# Patient Record
Sex: MALE | Race: WHITE | NOT HISPANIC OR LATINO | Employment: FULL TIME | URBAN - METROPOLITAN AREA
[De-identification: names, ages, dates, MRNs, and addresses within clinical notes are randomized per-mention and may not be internally consistent; named-entity substitution may affect disease eponyms.]

---

## 2023-11-09 ENCOUNTER — HOSPITAL ENCOUNTER (EMERGENCY)
Facility: HOSPITAL | Age: 53
Discharge: HOME | End: 2023-11-09
Attending: STUDENT IN AN ORGANIZED HEALTH CARE EDUCATION/TRAINING PROGRAM
Payer: COMMERCIAL

## 2023-11-09 ENCOUNTER — APPOINTMENT (OUTPATIENT)
Dept: RADIOLOGY | Facility: HOSPITAL | Age: 53
End: 2023-11-09
Payer: COMMERCIAL

## 2023-11-09 VITALS
DIASTOLIC BLOOD PRESSURE: 81 MMHG | TEMPERATURE: 97.9 F | BODY MASS INDEX: 22.4 KG/M2 | HEART RATE: 71 BPM | WEIGHT: 160 LBS | HEIGHT: 71 IN | RESPIRATION RATE: 20 BRPM | SYSTOLIC BLOOD PRESSURE: 117 MMHG | OXYGEN SATURATION: 98 %

## 2023-11-09 DIAGNOSIS — R06.02 SHORTNESS OF BREATH: Primary | ICD-10-CM

## 2023-11-09 DIAGNOSIS — J44.1 COPD EXACERBATION (MULTI): ICD-10-CM

## 2023-11-09 LAB
ANION GAP SERPL CALC-SCNC: 12 MMOL/L
BASOPHILS # BLD AUTO: 0.05 X10*3/UL (ref 0–0.1)
BASOPHILS NFR BLD AUTO: 0.5 %
BUN SERPL-MCNC: 15 MG/DL (ref 8–25)
CALCIUM SERPL-MCNC: 9.1 MG/DL (ref 8.5–10.4)
CHLORIDE SERPL-SCNC: 101 MMOL/L (ref 97–107)
CO2 SERPL-SCNC: 26 MMOL/L (ref 24–31)
CREAT SERPL-MCNC: 0.7 MG/DL (ref 0.4–1.6)
EOSINOPHIL # BLD AUTO: 0.32 X10*3/UL (ref 0–0.7)
EOSINOPHIL NFR BLD AUTO: 3 %
ERYTHROCYTE [DISTWIDTH] IN BLOOD BY AUTOMATED COUNT: 13.2 % (ref 11.5–14.5)
GFR SERPL CREATININE-BSD FRML MDRD: >90 ML/MIN/1.73M*2
GLUCOSE SERPL-MCNC: 87 MG/DL (ref 65–99)
HCT VFR BLD AUTO: 40.5 % (ref 41–52)
HGB BLD-MCNC: 13.2 G/DL (ref 13.5–17.5)
IMM GRANULOCYTES # BLD AUTO: 0.04 X10*3/UL (ref 0–0.7)
IMM GRANULOCYTES NFR BLD AUTO: 0.4 % (ref 0–0.9)
LYMPHOCYTES # BLD AUTO: 4.31 X10*3/UL (ref 1.2–4.8)
LYMPHOCYTES NFR BLD AUTO: 40.8 %
MCH RBC QN AUTO: 29 PG (ref 26–34)
MCHC RBC AUTO-ENTMCNC: 32.6 G/DL (ref 32–36)
MCV RBC AUTO: 89 FL (ref 80–100)
MONOCYTES # BLD AUTO: 0.58 X10*3/UL (ref 0.1–1)
MONOCYTES NFR BLD AUTO: 5.5 %
NEUTROPHILS # BLD AUTO: 5.27 X10*3/UL (ref 1.2–7.7)
NEUTROPHILS NFR BLD AUTO: 49.8 %
NRBC BLD-RTO: 0 /100 WBCS (ref 0–0)
PLATELET # BLD AUTO: 300 X10*3/UL (ref 150–450)
POTASSIUM SERPL-SCNC: 4 MMOL/L (ref 3.4–5.1)
RBC # BLD AUTO: 4.55 X10*6/UL (ref 4.5–5.9)
SODIUM SERPL-SCNC: 139 MMOL/L (ref 133–145)
TROPONIN T SERPL-MCNC: 10 NG/L
WBC # BLD AUTO: 10.6 X10*3/UL (ref 4.4–11.3)

## 2023-11-09 PROCEDURE — 96374 THER/PROPH/DIAG INJ IV PUSH: CPT

## 2023-11-09 PROCEDURE — 36415 COLL VENOUS BLD VENIPUNCTURE: CPT | Performed by: STUDENT IN AN ORGANIZED HEALTH CARE EDUCATION/TRAINING PROGRAM

## 2023-11-09 PROCEDURE — 85025 COMPLETE CBC W/AUTO DIFF WBC: CPT | Performed by: STUDENT IN AN ORGANIZED HEALTH CARE EDUCATION/TRAINING PROGRAM

## 2023-11-09 PROCEDURE — 82374 ASSAY BLOOD CARBON DIOXIDE: CPT | Performed by: STUDENT IN AN ORGANIZED HEALTH CARE EDUCATION/TRAINING PROGRAM

## 2023-11-09 PROCEDURE — 94760 N-INVAS EAR/PLS OXIMETRY 1: CPT

## 2023-11-09 PROCEDURE — 2500000004 HC RX 250 GENERAL PHARMACY W/ HCPCS (ALT 636 FOR OP/ED)

## 2023-11-09 PROCEDURE — 2500000002 HC RX 250 W HCPCS SELF ADMINISTERED DRUGS (ALT 637 FOR MEDICARE OP, ALT 636 FOR OP/ED): Performed by: STUDENT IN AN ORGANIZED HEALTH CARE EDUCATION/TRAINING PROGRAM

## 2023-11-09 PROCEDURE — 2500000004 HC RX 250 GENERAL PHARMACY W/ HCPCS (ALT 636 FOR OP/ED): Performed by: STUDENT IN AN ORGANIZED HEALTH CARE EDUCATION/TRAINING PROGRAM

## 2023-11-09 PROCEDURE — 94640 AIRWAY INHALATION TREATMENT: CPT

## 2023-11-09 PROCEDURE — 71046 X-RAY EXAM CHEST 2 VIEWS: CPT | Mod: FY

## 2023-11-09 PROCEDURE — 99284 EMERGENCY DEPT VISIT MOD MDM: CPT | Mod: 25

## 2023-11-09 PROCEDURE — 96375 TX/PRO/DX INJ NEW DRUG ADDON: CPT

## 2023-11-09 PROCEDURE — 84484 ASSAY OF TROPONIN QUANT: CPT | Performed by: STUDENT IN AN ORGANIZED HEALTH CARE EDUCATION/TRAINING PROGRAM

## 2023-11-09 PROCEDURE — 99285 EMERGENCY DEPT VISIT HI MDM: CPT | Mod: 25 | Performed by: STUDENT IN AN ORGANIZED HEALTH CARE EDUCATION/TRAINING PROGRAM

## 2023-11-09 RX ORDER — IPRATROPIUM BROMIDE AND ALBUTEROL SULFATE 2.5; .5 MG/3ML; MG/3ML
3 SOLUTION RESPIRATORY (INHALATION)
Status: DISCONTINUED | OUTPATIENT
Start: 2023-11-09 | End: 2023-11-09 | Stop reason: HOSPADM

## 2023-11-09 RX ORDER — AZITHROMYCIN 250 MG/1
250 TABLET, FILM COATED ORAL DAILY
Qty: 5 TABLET | Refills: 0 | Status: SHIPPED | OUTPATIENT
Start: 2023-11-09 | End: 2023-11-14

## 2023-11-09 RX ORDER — PREDNISONE 20 MG/1
20 TABLET ORAL 2 TIMES DAILY
Qty: 10 TABLET | Refills: 0 | Status: SHIPPED | OUTPATIENT
Start: 2023-11-09 | End: 2023-11-14

## 2023-11-09 RX ORDER — KETOROLAC TROMETHAMINE 30 MG/ML
15 INJECTION, SOLUTION INTRAMUSCULAR; INTRAVENOUS ONCE
Status: COMPLETED | OUTPATIENT
Start: 2023-11-09 | End: 2023-11-09

## 2023-11-09 RX ORDER — IPRATROPIUM BROMIDE AND ALBUTEROL SULFATE 2.5; .5 MG/3ML; MG/3ML
3 SOLUTION RESPIRATORY (INHALATION)
Status: DISCONTINUED | OUTPATIENT
Start: 2023-11-09 | End: 2023-11-09 | Stop reason: SDUPTHER

## 2023-11-09 RX ADMIN — KETOROLAC TROMETHAMINE 15 MG: 30 INJECTION, SOLUTION INTRAMUSCULAR at 18:30

## 2023-11-09 RX ADMIN — METHYLPREDNISOLONE SODIUM SUCCINATE 125 MG: 125 INJECTION, POWDER, FOR SOLUTION INTRAMUSCULAR; INTRAVENOUS at 18:29

## 2023-11-09 RX ADMIN — IPRATROPIUM BROMIDE AND ALBUTEROL SULFATE 3 ML: 2.5; .5 SOLUTION RESPIRATORY (INHALATION) at 18:36

## 2023-11-09 ASSESSMENT — COLUMBIA-SUICIDE SEVERITY RATING SCALE - C-SSRS
2. HAVE YOU ACTUALLY HAD ANY THOUGHTS OF KILLING YOURSELF?: NO
6. HAVE YOU EVER DONE ANYTHING, STARTED TO DO ANYTHING, OR PREPARED TO DO ANYTHING TO END YOUR LIFE?: NO
1. IN THE PAST MONTH, HAVE YOU WISHED YOU WERE DEAD OR WISHED YOU COULD GO TO SLEEP AND NOT WAKE UP?: NO

## 2023-11-09 ASSESSMENT — PAIN DESCRIPTION - DESCRIPTORS: DESCRIPTORS: ACHING

## 2023-11-09 ASSESSMENT — PAIN DESCRIPTION - PROGRESSION: CLINICAL_PROGRESSION: NOT CHANGED

## 2023-11-09 ASSESSMENT — PAIN DESCRIPTION - FREQUENCY: FREQUENCY: CONSTANT/CONTINUOUS

## 2023-11-09 ASSESSMENT — PAIN DESCRIPTION - PAIN TYPE: TYPE: ACUTE PAIN

## 2023-11-09 ASSESSMENT — PAIN - FUNCTIONAL ASSESSMENT: PAIN_FUNCTIONAL_ASSESSMENT: 0-10

## 2023-11-09 ASSESSMENT — PAIN SCALES - GENERAL: PAINLEVEL_OUTOF10: 4

## 2023-11-09 ASSESSMENT — PAIN DESCRIPTION - LOCATION: LOCATION: CHEST

## 2023-11-09 NOTE — ED PROVIDER NOTES
HPI   Chief Complaint   Patient presents with    Shortness of Breath     X 2 weeks, COPD, smoker       Patient is a 53-year-old male with history of COPD, asthma presenting to [facility] ER for evaluation of shortness of breath.  Patient states that his shortness of breath has been going on for about a week now.  States this is accompanied by chest pain that is radiating down his back.  He describes the pain as a chest tightness.  Patient also has a dry cough but states this is normal for him patient states he has been using his albuterol nebulizer at home for the past week now and it has not offered him the same relief it usually does.  He states the last time he had similar symptoms, he was admitted to the hospital for pneumonia.  Patient denies fever, chills, abdominal pain, sore throat, runny nose, diarrhea, constipation, dysuria, urgency, frequency.  Patient denies recent travel, leg swelling, leg tenderness or erythema.                          No data recorded                Patient History   No past medical history on file.  No past surgical history on file.  No family history on file.  Social History     Tobacco Use    Smoking status: Not on file    Smokeless tobacco: Not on file   Substance Use Topics    Alcohol use: Not on file    Drug use: Not on file       Physical Exam   ED Triage Vitals [11/09/23 1542]   Temp Heart Rate Resp BP   36.6 °C (97.9 °F) 71 20 117/81      SpO2 Temp Source Heart Rate Source Patient Position   98 % Oral Brachial Sitting      BP Location FiO2 (%)     Left arm --       Physical Exam  Vitals and nursing note reviewed.   Constitutional:       Appearance: He is well-developed. He is not ill-appearing.   HENT:      Head: Normocephalic and atraumatic.      Mouth/Throat:      Mouth: Mucous membranes are moist.      Pharynx: Oropharynx is clear.   Cardiovascular:      Rate and Rhythm: Normal rate and regular rhythm.      Heart sounds: No murmur heard.     No friction rub. No gallop.    Pulmonary:      Effort: Pulmonary effort is normal. No respiratory distress.      Comments: Diffuse wheezes and rhonchi throughout the lung lobes.  Chest:      Chest wall: No mass, deformity or tenderness.   Abdominal:      Palpations: Abdomen is soft.      Tenderness: There is no abdominal tenderness.   Musculoskeletal:      Right lower leg: No tenderness. No edema.      Left lower leg: No tenderness. No edema.   Skin:     General: Skin is warm and dry.   Neurological:      General: No focal deficit present.      Mental Status: He is alert and oriented to person, place, and time.   Psychiatric:         Mood and Affect: Mood normal.         Behavior: Behavior normal.         ED Course & [facility]   ED Course as of 11/09/23 1947   Thu Nov 09, 2023 1839 Spoke with patient.  States he is feeling a little bit better with the DuoNeb. [JJ]      ED Course User Index  [JJ] Erin Roman PA-C         Diagnoses as of 11/09/23 1947   Shortness of breath   COPD exacerbation (CMS/[facility])       Medical Decision Making  53-year-old male with history of asthma and COPD presenting to ER for evaluation of shortness of breath.    EKG was performed and interpreted by attending physician.    Laboratory studies performed today include CBC with differential, BMP, troponin series.  The studies were unremarkable.    Radiologic studies performed today include chest x-ray.  Findings include no acute cardiopulmonary process and were reviewed by the attending physician and myself.    I saw this patient in conjunction with Dr. Mitchell.  Based on patient's presentation and history combined with work-up, there is low suspicion for MI, PE, pneumonia, epiglottitis, respiratory distress, sepsis.  Patient is well-appearing, alert and oriented, resting comfortably in exam room during the entirety of the visit.  Thus I consider the discharge disposition to be appropriate.  We have discussed the diagnosis and risk.  And agree with discharging the patient home  to follow-up on an outpatient basis as directed.  I discussed symptoms that would warrant immediate return back to the emergency department.  The patient is agreeable to this plan.    Medications provided to the patient during this visit include DuoNeb, Toradol, Solu-Medrol.    Home medications provided to this patient include a azithromycin and prednisone.  Instructions were provided to the patient for picking up these medications and appropriate dosage.    Amount and/or Complexity of Data Reviewed  Labs:  Decision-making details documented in ED Course.  Radiology:  Decision-making details documented in ED Course.        Procedure  Procedures     Erin Roman PA-C  11/09/23 1955

## 2023-11-09 NOTE — ED PROVIDER NOTES
Supervisory note:  Patient seen in conjunction with KALIE Qiu.  Patient presents with shortness of breath.  For the last week, he has had shortness of breath and has been coughing as well.  Patient has been told he has COPD and continues to smoke.  On examination, lungs have expiratory wheezes but heart sounds are unremarkable.    EKG interpretation: Normal sinus rhythm, rate 63.  Normal axis.  Right bundle branch block morphology.  No ST or T wave abnormalities.    Laboratory studies are unremarkable.  Chest x-ray without acute findings.  Following breathing treatments, steroids, patient reports improvement in symptoms.  I feel that the most likely etiology of symptoms is COPD exacerbation.  My suspicion for acute coronary syndrome or PE as etiology of symptoms is very low.  Patient advised to follow-up with primary care physician and given prescription for antibiotics for COPD exacerbation.  Return precautions given for any worsening symptoms.    I personally saw the patient and performed a substantive portion of the visit including all aspects of the medical decision making.  Parts of this chart were completed with dictation software, please excuse any errors in transcription.     Isael Mitchell MD  11/09/23 0203

## 2023-11-13 ENCOUNTER — HOSPITAL ENCOUNTER (OUTPATIENT)
Dept: CARDIOLOGY | Facility: HOSPITAL | Age: 53
Discharge: HOME | End: 2023-11-13
Payer: COMMERCIAL

## 2023-11-13 LAB
ATRIAL RATE: 63 BPM
P AXIS: 72 DEGREES
P OFFSET: 207 MS
P ONSET: 156 MS
PR INTERVAL: 124 MS
Q ONSET: 218 MS
QRS COUNT: 11 BEATS
QRS DURATION: 108 MS
QT INTERVAL: 436 MS
QTC CALCULATION(BAZETT): 446 MS
QTC FREDERICIA: 443 MS
R AXIS: 94 DEGREES
T AXIS: 75 DEGREES
T OFFSET: 436 MS
VENTRICULAR RATE: 63 BPM

## 2023-11-13 PROCEDURE — 93005 ELECTROCARDIOGRAM TRACING: CPT

## 2023-12-28 ENCOUNTER — APPOINTMENT (OUTPATIENT)
Dept: RADIOLOGY | Facility: HOSPITAL | Age: 53
End: 2023-12-28
Payer: COMMERCIAL

## 2023-12-28 ENCOUNTER — APPOINTMENT (OUTPATIENT)
Dept: CARDIOLOGY | Facility: HOSPITAL | Age: 53
End: 2023-12-28
Payer: COMMERCIAL

## 2023-12-28 ENCOUNTER — TELEPHONE (OUTPATIENT)
Dept: PRIMARY CARE | Facility: CLINIC | Age: 53
End: 2023-12-28

## 2023-12-28 ENCOUNTER — HOSPITAL ENCOUNTER (EMERGENCY)
Facility: HOSPITAL | Age: 53
Discharge: HOME | End: 2023-12-28
Attending: EMERGENCY MEDICINE
Payer: COMMERCIAL

## 2023-12-28 VITALS
OXYGEN SATURATION: 98 % | DIASTOLIC BLOOD PRESSURE: 84 MMHG | HEART RATE: 75 BPM | TEMPERATURE: 97.9 F | BODY MASS INDEX: 23.19 KG/M2 | WEIGHT: 162 LBS | RESPIRATION RATE: 17 BRPM | SYSTOLIC BLOOD PRESSURE: 126 MMHG | HEIGHT: 70 IN

## 2023-12-28 DIAGNOSIS — R20.2 PARESTHESIA OF ARM: ICD-10-CM

## 2023-12-28 DIAGNOSIS — I10 DIASTOLIC HYPERTENSION: ICD-10-CM

## 2023-12-28 DIAGNOSIS — M54.50 ACUTE MIDLINE LOW BACK PAIN WITHOUT SCIATICA: Primary | ICD-10-CM

## 2023-12-28 DIAGNOSIS — U07.1 COVID-19: ICD-10-CM

## 2023-12-28 LAB
ALBUMIN SERPL-MCNC: 4.3 G/DL (ref 3.5–5)
ALP BLD-CCNC: 80 U/L (ref 35–125)
ALT SERPL-CCNC: 18 U/L (ref 5–40)
ANION GAP SERPL CALC-SCNC: 12 MMOL/L
APPEARANCE UR: CLEAR
AST SERPL-CCNC: 21 U/L (ref 5–40)
BASOPHILS # BLD AUTO: 0.02 X10*3/UL (ref 0–0.1)
BASOPHILS NFR BLD AUTO: 0.3 %
BILIRUB SERPL-MCNC: 0.3 MG/DL (ref 0.1–1.2)
BILIRUB UR STRIP.AUTO-MCNC: NEGATIVE MG/DL
BUN SERPL-MCNC: 25 MG/DL (ref 8–25)
CALCIUM SERPL-MCNC: 8.9 MG/DL (ref 8.5–10.4)
CHLORIDE SERPL-SCNC: 103 MMOL/L (ref 97–107)
CO2 SERPL-SCNC: 26 MMOL/L (ref 24–31)
COLOR UR: YELLOW
CREAT SERPL-MCNC: 0.9 MG/DL (ref 0.4–1.6)
EOSINOPHIL # BLD AUTO: 0.15 X10*3/UL (ref 0–0.7)
EOSINOPHIL NFR BLD AUTO: 2 %
ERYTHROCYTE [DISTWIDTH] IN BLOOD BY AUTOMATED COUNT: 12.8 % (ref 11.5–14.5)
GFR SERPL CREATININE-BSD FRML MDRD: >90 ML/MIN/1.73M*2
GLUCOSE SERPL-MCNC: 90 MG/DL (ref 65–99)
GLUCOSE UR STRIP.AUTO-MCNC: NORMAL MG/DL
HCT VFR BLD AUTO: 42.9 % (ref 41–52)
HGB BLD-MCNC: 13.7 G/DL (ref 13.5–17.5)
IMM GRANULOCYTES # BLD AUTO: 0.04 X10*3/UL (ref 0–0.7)
IMM GRANULOCYTES NFR BLD AUTO: 0.5 % (ref 0–0.9)
KETONES UR STRIP.AUTO-MCNC: ABNORMAL MG/DL
LEUKOCYTE ESTERASE UR QL STRIP.AUTO: NEGATIVE
LYMPHOCYTES # BLD AUTO: 2.45 X10*3/UL (ref 1.2–4.8)
LYMPHOCYTES NFR BLD AUTO: 32.1 %
MCH RBC QN AUTO: 28.5 PG (ref 26–34)
MCHC RBC AUTO-ENTMCNC: 31.9 G/DL (ref 32–36)
MCV RBC AUTO: 89 FL (ref 80–100)
MONOCYTES # BLD AUTO: 0.56 X10*3/UL (ref 0.1–1)
MONOCYTES NFR BLD AUTO: 7.3 %
MUCOUS THREADS #/AREA URNS AUTO: NORMAL /LPF
NEUTROPHILS # BLD AUTO: 4.42 X10*3/UL (ref 1.2–7.7)
NEUTROPHILS NFR BLD AUTO: 57.8 %
NITRITE UR QL STRIP.AUTO: NEGATIVE
NRBC BLD-RTO: 0 /100 WBCS (ref 0–0)
PH UR STRIP.AUTO: 5.5 [PH]
PLATELET # BLD AUTO: 262 X10*3/UL (ref 150–450)
POTASSIUM SERPL-SCNC: 3.5 MMOL/L (ref 3.4–5.1)
PROT SERPL-MCNC: 6.9 G/DL (ref 5.9–7.9)
PROT UR STRIP.AUTO-MCNC: ABNORMAL MG/DL
RBC # BLD AUTO: 4.81 X10*6/UL (ref 4.5–5.9)
RBC # UR STRIP.AUTO: NEGATIVE /UL
RBC #/AREA URNS AUTO: NORMAL /HPF
SARS-COV-2 RNA RESP QL NAA+PROBE: DETECTED
SODIUM SERPL-SCNC: 141 MMOL/L (ref 133–145)
SP GR UR STRIP.AUTO: 1.04
SQUAMOUS #/AREA URNS AUTO: NORMAL /HPF
TROPONIN T SERPL-MCNC: 11 NG/L
TROPONIN T SERPL-MCNC: 12 NG/L
UROBILINOGEN UR STRIP.AUTO-MCNC: ABNORMAL MG/DL
WBC # BLD AUTO: 7.6 X10*3/UL (ref 4.4–11.3)
WBC #/AREA URNS AUTO: NORMAL /HPF

## 2023-12-28 PROCEDURE — 72110 X-RAY EXAM L-2 SPINE 4/>VWS: CPT

## 2023-12-28 PROCEDURE — 96374 THER/PROPH/DIAG INJ IV PUSH: CPT

## 2023-12-28 PROCEDURE — 84484 ASSAY OF TROPONIN QUANT: CPT | Performed by: EMERGENCY MEDICINE

## 2023-12-28 PROCEDURE — 85025 COMPLETE CBC W/AUTO DIFF WBC: CPT | Performed by: EMERGENCY MEDICINE

## 2023-12-28 PROCEDURE — 99284 EMERGENCY DEPT VISIT MOD MDM: CPT | Performed by: EMERGENCY MEDICINE

## 2023-12-28 PROCEDURE — 96372 THER/PROPH/DIAG INJ SC/IM: CPT

## 2023-12-28 PROCEDURE — 96375 TX/PRO/DX INJ NEW DRUG ADDON: CPT

## 2023-12-28 PROCEDURE — 96361 HYDRATE IV INFUSION ADD-ON: CPT

## 2023-12-28 PROCEDURE — 84075 ASSAY ALKALINE PHOSPHATASE: CPT | Performed by: EMERGENCY MEDICINE

## 2023-12-28 PROCEDURE — 81001 URINALYSIS AUTO W/SCOPE: CPT | Performed by: EMERGENCY MEDICINE

## 2023-12-28 PROCEDURE — 2500000005 HC RX 250 GENERAL PHARMACY W/O HCPCS: Performed by: EMERGENCY MEDICINE

## 2023-12-28 PROCEDURE — 99285 EMERGENCY DEPT VISIT HI MDM: CPT | Mod: 25

## 2023-12-28 PROCEDURE — 70450 CT HEAD/BRAIN W/O DYE: CPT

## 2023-12-28 PROCEDURE — 87635 SARS-COV-2 COVID-19 AMP PRB: CPT | Performed by: EMERGENCY MEDICINE

## 2023-12-28 PROCEDURE — 36415 COLL VENOUS BLD VENIPUNCTURE: CPT | Performed by: EMERGENCY MEDICINE

## 2023-12-28 PROCEDURE — 93005 ELECTROCARDIOGRAM TRACING: CPT

## 2023-12-28 PROCEDURE — 2500000004 HC RX 250 GENERAL PHARMACY W/ HCPCS (ALT 636 FOR OP/ED): Performed by: EMERGENCY MEDICINE

## 2023-12-28 PROCEDURE — 71045 X-RAY EXAM CHEST 1 VIEW: CPT

## 2023-12-28 RX ORDER — LIDOCAINE 560 MG/1
1 PATCH PERCUTANEOUS; TOPICAL; TRANSDERMAL DAILY
Status: DISCONTINUED | OUTPATIENT
Start: 2023-12-28 | End: 2023-12-28 | Stop reason: HOSPADM

## 2023-12-28 RX ORDER — NAPROXEN SODIUM 220 MG/1
81 TABLET, FILM COATED ORAL DAILY
Qty: 14 TABLET | Refills: 0 | Status: SHIPPED | OUTPATIENT
Start: 2023-12-28 | End: 2024-01-11

## 2023-12-28 RX ORDER — IBUPROFEN 600 MG/1
600 TABLET ORAL EVERY 6 HOURS PRN
Qty: 20 TABLET | Refills: 0 | Status: SHIPPED | OUTPATIENT
Start: 2023-12-28 | End: 2024-01-02

## 2023-12-28 RX ORDER — KETOROLAC TROMETHAMINE 30 MG/ML
15 INJECTION, SOLUTION INTRAMUSCULAR; INTRAVENOUS ONCE
Status: COMPLETED | OUTPATIENT
Start: 2023-12-28 | End: 2023-12-28

## 2023-12-28 RX ORDER — METHOCARBAMOL 500 MG/1
500 TABLET, FILM COATED ORAL 3 TIMES DAILY
Qty: 15 TABLET | Refills: 0 | Status: SHIPPED | OUTPATIENT
Start: 2023-12-28 | End: 2024-01-02

## 2023-12-28 RX ORDER — LIDOCAINE 50 MG/G
1 PATCH TOPICAL DAILY
Qty: 5 PATCH | Refills: 0 | Status: SHIPPED | OUTPATIENT
Start: 2023-12-28 | End: 2024-01-23 | Stop reason: ALTCHOICE

## 2023-12-28 RX ORDER — ACETAMINOPHEN 325 MG/1
650 TABLET ORAL ONCE
Status: COMPLETED | OUTPATIENT
Start: 2023-12-28 | End: 2023-12-28

## 2023-12-28 RX ORDER — ORPHENADRINE CITRATE 30 MG/ML
60 INJECTION INTRAMUSCULAR; INTRAVENOUS ONCE
Status: COMPLETED | OUTPATIENT
Start: 2023-12-28 | End: 2023-12-28

## 2023-12-28 RX ORDER — DEXAMETHASONE SODIUM PHOSPHATE 100 MG/10ML
10 INJECTION INTRAMUSCULAR; INTRAVENOUS ONCE
Status: COMPLETED | OUTPATIENT
Start: 2023-12-28 | End: 2023-12-28

## 2023-12-28 RX ADMIN — ACETAMINOPHEN 650 MG: 325 TABLET ORAL at 06:30

## 2023-12-28 RX ADMIN — SODIUM CHLORIDE 1000 ML: 900 INJECTION, SOLUTION INTRAVENOUS at 06:32

## 2023-12-28 RX ADMIN — DEXAMETHASONE SODIUM PHOSPHATE 10 MG: 10 INJECTION INTRAMUSCULAR; INTRAVENOUS at 06:31

## 2023-12-28 RX ADMIN — LIDOCAINE 1 PATCH: 4 PATCH TOPICAL at 06:30

## 2023-12-28 RX ADMIN — ORPHENADRINE CITRATE 60 MG: 60 INJECTION INTRAMUSCULAR; INTRAVENOUS at 06:31

## 2023-12-28 RX ADMIN — KETOROLAC TROMETHAMINE 15 MG: 30 INJECTION, SOLUTION INTRAMUSCULAR; INTRAVENOUS at 06:31

## 2023-12-28 ASSESSMENT — PAIN SCALES - GENERAL: PAINLEVEL_OUTOF10: 10 - WORST POSSIBLE PAIN

## 2023-12-28 ASSESSMENT — LIFESTYLE VARIABLES
EVER FELT BAD OR GUILTY ABOUT YOUR DRINKING: NO
REASON UNABLE TO ASSESS: NO
HAVE YOU EVER FELT YOU SHOULD CUT DOWN ON YOUR DRINKING: NO
EVER HAD A DRINK FIRST THING IN THE MORNING TO STEADY YOUR NERVES TO GET RID OF A HANGOVER: NO
HAVE PEOPLE ANNOYED YOU BY CRITICIZING YOUR DRINKING: NO

## 2023-12-28 ASSESSMENT — PAIN - FUNCTIONAL ASSESSMENT: PAIN_FUNCTIONAL_ASSESSMENT: 0-10

## 2023-12-28 ASSESSMENT — PAIN DESCRIPTION - PAIN TYPE: TYPE: ACUTE PAIN

## 2023-12-28 NOTE — DISCHARGE INSTRUCTIONS
Follow-up with your primary care physician within 1 to 2 days for further management of your current symptoms, repeat check of your blood pressure, and to discuss a possible referral to physical therapy for further management of your back pain.      Follow-up with orthospine and neurology as scheduled.      Return to the emergency department sooner with worsening of symptoms or onset of new symptoms      Your should quarantine for the next 5 days unless you are seen and cleared sooner by your primary care physician.

## 2023-12-28 NOTE — Clinical Note
Jose M Anne was seen and treated in our emergency department on 12/28/2023.  He may return to work on 01/01/2024.       If you have any questions or concerns, please don't hesitate to call.      Radha Martinez MD

## 2023-12-28 NOTE — ED PROVIDER NOTES
HPI   Chief Complaint   Patient presents with    Numbness     Right arm 2-3 days    Back Pain     Lower back pain x 1 week       HPI                    No data recorded                Patient History   No past medical history on file.  No past surgical history on file.  No family history on file.  Social History     Tobacco Use    Smoking status: Not on file    Smokeless tobacco: Not on file   Substance Use Topics    Alcohol use: Not on file    Drug use: Not on file       Physical Exam   ED Triage Vitals [12/28/23 0555]   Temp Heart Rate Resp BP   36.4 °C (97.5 °F) 81 18 118/82      SpO2 Temp Source Heart Rate Source Patient Position   97 % Oral Monitor --      BP Location FiO2 (%)     -- --       Physical Exam    ED Course & MDM   Diagnoses as of 12/28/23 0815   Acute midline low back pain without sciatica   Paresthesia of arm   Diastolic hypertension   COVID-19       Medical Decision Making    The patient is a 53-year-old male presenting to the emergency department for evaluation of an acute exacerbation of his chronic low back pain and right arm numbness and tingling.  The patient states that he started having worsening of his back pain about a week ago.  He states he does not recall any specific injury or trauma.  He has noticed that his urine has been dark.  He states that the pain is worse when he is walking, standing and bending over or moving around.  No other better or worse.  No radiation.  It is a constant aching pain in his lower back.  He denies any bowel or bladder incontinence.  No urinary retention.  No history of IV drug abuse.  No history of malignancy.  He states that the right arm tingling started 3 to 4 days ago.  He states he can feel his arm and he can move his arm without any difficulty.  He denies any headache or visual changes.  No difficulty swallowing or speaking.  He denies any motor weakness or numbness.  He denies any neck upper back pain.  No abdominal pain.  No nausea or vomiting.   No diarrhea or constipation.  He does smoke daily.  He denies having any chronic medical conditions.  All pertinent positives and negatives are recorded above.  All other systems reviewed and otherwise negative.  Vital signs with diastolic hypertension but otherwise within normal limits.  Physical exam with a well-nourished well-developed male in no acute distress.  HEENT exam within normal limits.  He has no evidence of airway compromise or respiratory distress.  Abdominal exam is benign.  He has no gross motor, neurologic or vascular deficits on exam.  NIH stroke scale score of 0.  He has 5/5 strength in all 4 extremities.  No sensory deficits.  Reflexes are intact.  He is able to walk and stand without assistance.  He is able to converse without difficulty.      EKG with normal sinus rhythm at 60 bpm, rightward axis, incomplete right bundle branch block pattern, normal voltage, normal ST segment, and normal T waves      IV fluids, oral acetaminophen, IV Toradol, Lidoderm patch, IV Decadron and IM Norflex ordered.      Diagnostic labs with positive COVID-19 test but otherwise unremarkable.      Initial Troponin T 12.  Repeat Troponin T      XR chest 1 view   Final Result   No acute pathologic findings are identified.   There is no interval change when compared to the previous examination.        MACRO:   none        Signed by: Jerry Holley 12/28/2023 7:50 AM   Dictation workstation:   [id number]      XR lumbar spine complete 4+ views   Final Result   No acute pathologic findings are identified.   Lumbar dextroscoliosis and lumbar spondylosis as above.   Postsurgical findings as above.        MACRO:   none        Signed by: Jerry Holley 12/28/2023 7:50 AM   Dictation workstation:   [id number]      CT head wo IV contrast   Final Result   Addendum (preliminary) 1 of 1   Interpreted By:  Laurie Hough,    ADDENDUM:   Preliminary interpretation provided by [employer].        Signed by: Laurie Hough 12/28/2023  8:05 AM        -------- ORIGINAL REPORT --------   Dictation workstation:   [id number]      Final   No acute intracranial process.        Arachnoid cyst seen within the posterior fossa posterior to the   cerebellum.        Minimal chronic right ethmoid and bilateral maxillary sinusitis.        Signed by: Laurie Hough 12/28/2023 8:00 AM   Dictation workstation:   [id number]           There is no evidence of ischemia on EKG or cardiac enzymes.  No events on telemetry.  CT head with an arachnoid cyst in the posterior fossa posterior to the cerebellum and mild right ethmoid and bilateral maxillary sinusitis but otherwise unremarkable.  No evidence of acute CVA.  X-ray of the lower back without evidence of fracture or dislocation.  Chest x-ray without acute process.  No evidence of pneumonia or pneumothorax.  Diagnostic labs with a positive COVID-19 test but otherwise unremarkable.      Neurology, Dr. Solis, was consulted regarding the paresthesia of the right upper extremity.  He did review the results of the diagnostic studies and discussed the case.  He does not feel that he represents acute CVA.  He does recommend outpatient follow-up.  He does not feel tPA/TNK is indicated at this time.      The patient was released in good condition.  He was instructed to follow-up with his primary care physician within 1 to 2 days for further management of his current symptoms, repeat check of his blood pressure and to discuss a possible referral to physical therapy for further evaluation/management of his low back pain.  He was given a referral to orthospine.  He was also on a referral to neurology.  He was instructed to return to the emergency department sooner with worsening of symptoms or onset of new symptoms.  He was instructed to take a daily baby aspirin.  He was also given a prescription for ibuprofen, Robaxin and Lidoderm patches.  Will return to the emergency department sooner with worsening of symptoms or onset of new  symptoms.      Impression/diagnosis  Acute exacerbation of chronic low back pain  Right arm paresthesias  Diastolic hypertension  Arachnoid cyst  COVID-19      I reviewed the results of the diagnostic labs and diagnostic imaging.  Formal radiology reading was completed by the radiologist      Procedure  Procedures     Radha Martinez MD  12/28/23 6425

## 2024-01-04 NOTE — ED NOTES
"[facility]  Emergency Department Neurology Phone Call Consultation     Synopsis: I was called at 0818 hours on 12-28-23 to discuss this case with Radha Martinez MD ([facility] ED) by phone and did not have the opportunity to review his past neurological records to prepare my response. He has Spine Surgeon (Kaitlin) on the case, who saw him in September 2023 for his cervical radiculopathy but he is not scheduled to see him again, nor was the MRI cervical spine requested ever done, despite his failure to improve with physiotherapy. Although his presentation was atraumatic, the coughing with his second COVID-19 infection in 13 months, despite vaccination and his continued smoking, his good strength on the ED examination, makes stroke unlikely, and cervical muscle strain etiology most likely, and he may need EMG/NCV analysis to support the plan of care regarding his known cervical spine condition.  The arachnoid cyst is asymptomatic, and more likely represents a developmental anomaly. The plan was discharge to get a second Spine opinion with Colton Velazco MD ([facility]) and an Outpatient Neurology appointment with Yan Humphrey MD ([facility]); perhaps they can appeal to his Medical Insurance Company to get the MRI cervical spine completed as well.     CT brain without contrast (12/28/23): No acute intracranial process. Arachnoid cyst seen within the posterior fossa posterior to the cerebellum. Minimal chronic right ethmoid and bilateral maxillary sinusitis. I personally reviewed these images and concur with the radiological interpretation.       Review of records pertinent to this call:   Radha Martinez MD ([facility] ED) on 12-28-23: \"...a 53-year-old male presenting to the emergency department for evaluation of an acute exacerbation of his chronic low back pain and right arm numbness and tingling. The patient states that he started having worsening of his back pain " about a week ago.  He states he does not recall any specific injury or trauma.  He has noticed that his urine has been dark.  He states that the pain is worse when he is walking, standing and bending over or moving around.  No other better or worse.  No radiation.  It is a constant aching pain in his lower back.  He denies any bowel or bladder incontinence.  No urinary retention.  No history of IV drug abuse.  No history of malignancy.  He states that the right arm tingling started 3 to 4 days ago.  He states he can feel his arm and he can move his arm without any difficulty.  He denies any headache or visual changes.  No difficulty swallowing or speaking.  He denies any motor weakness or numbness.  He denies any neck upper back pain.  No abdominal pain.  No nausea or vomiting.  No diarrhea or constipation.  He does smoke daily.  He denies having any chronic medical conditions.  All pertinent positives and negatives are recorded above.  All other systems reviewed and otherwise negative.  Vital signs with diastolic hypertension but otherwise within normal limits.  Physical exam with a well-nourished well-developed male in no acute distress.  HEENT exam within normal limits.  He has no evidence of airway compromise or respiratory distress.  Abdominal exam is benign.  He has no gross motor, neurologic or vascular deficits on exam.  NIH stroke scale score of 0.  He has 5/5 strength in all 4 extremities.  No sensory deficits.  Reflexes are intact.  He is able to walk and stand without assistance.  He is able to converse without difficulty. EKG with normal sinus rhythm at 60 BPM, rightward axis, incomplete right bundle branch block pattern, normal voltage, normal ST segment, and normal T waves IV fluids, oral acetaminophen, IV Toradol, Lidoderm patch, IV Decadron and IM Norflex ordered. Diagnostic labs with positive COVID-19 test but otherwise unremarkable...Neurology, Dr. Solis, was consulted regarding the paresthesia of  "the right upper extremity.  He did review the results of the diagnostic studies and discussed the case.  He does not feel that he represents acute CVA.  He does recommend outpatient follow-up.  He does not feel tPA/TNK is indicated at this time. The patient was released in good condition.  He was instructed to follow-up with his primary care physician within 1 to 2 days for further management of his current symptoms, repeat check of his blood pressure and to discuss a possible referral to physical therapy for further evaluation/management of his low back pain.  He was given a referral to orthospine.  He was also on a referral to neurology.  He was instructed to return to the emergency department sooner with worsening of symptoms or onset of new symptoms.  He was instructed to take a daily baby aspirin.  He was also given a prescription for ibuprofen, Robaxin and Lidoderm patches.  Will return to the emergency department sooner with worsening of symptoms or onset of new symptoms...\".     Beranbe Madrigal MD (Chief of Spine Surgery and Director of Spine Service, [facility]) on 9-15-23; \"...53-year-old man, with intractable cervical radiculopathy in the setting of significant cervical kyphosis. His symptoms have persisted despite physical therapy and injections. At this time an MRI and follow-up to discuss potential surgical intervention would be appropriate. Pending potential surgery, the patient will also need to stop smoking...will follow-up after the MRI to discuss more specifics about the potential surgical intervention...a 53-year-old man, who comes in with a 5-month history of neck pain as well as arm pain with numbness and tingling goes down the arm on the left side. The pain in the arm is rather constant. He has done physical therapy at [facility] which was not helpful unfortunately. He is also had injections in the past. He has not had a recent MRI...     I have " reviewed the records today (1/4/24) and see no reason to modify my advice given on 12-28-23. He has excellent follow up for this problem with the combination of Barry Alcocer MD ([facility]), Colotn Velazco MD ([facility]) or Yan Humphrey MD ([facility]); perhaps they can appeal to his Medical Insurance Company to get the MRI cervical spine completed as well.       Oliver Solis MD  [facility] Neurohospitalist

## 2024-01-07 LAB
ATRIAL RATE: 60 BPM
P AXIS: 69 DEGREES
P OFFSET: 203 MS
P ONSET: 155 MS
PR INTERVAL: 126 MS
Q ONSET: 218 MS
QRS COUNT: 10 BEATS
QRS DURATION: 112 MS
QT INTERVAL: 464 MS
QTC CALCULATION(BAZETT): 464 MS
QTC FREDERICIA: 464 MS
R AXIS: 94 DEGREES
T AXIS: 80 DEGREES
T OFFSET: 450 MS
VENTRICULAR RATE: 60 BPM

## 2024-01-16 ENCOUNTER — TELEPHONE (OUTPATIENT)
Dept: PRIMARY CARE | Facility: CLINIC | Age: 54
End: 2024-01-16
Payer: COMMERCIAL

## 2024-01-16 NOTE — TELEPHONE ENCOUNTER
Pt called  [phone] he has a cyst on his brain not his neck was referred to Dr Velazco but he does not do brain cysts, he called  to schedule and they need a referral in the system

## 2024-01-16 NOTE — TELEPHONE ENCOUNTER
He needs to schedule an appointment with Dr. Alcocer to follow up from his hospital visit 12/28 and they can discuss referral at that time.

## 2024-01-23 ENCOUNTER — OFFICE VISIT (OUTPATIENT)
Dept: PRIMARY CARE | Facility: CLINIC | Age: 54
End: 2024-01-23
Payer: COMMERCIAL

## 2024-01-23 VITALS
HEART RATE: 89 BPM | BODY MASS INDEX: 24.36 KG/M2 | WEIGHT: 169.8 LBS | OXYGEN SATURATION: 98 % | DIASTOLIC BLOOD PRESSURE: 78 MMHG | SYSTOLIC BLOOD PRESSURE: 128 MMHG

## 2024-01-23 DIAGNOSIS — R41.3 MEMORY LOSS: ICD-10-CM

## 2024-01-23 DIAGNOSIS — G93.0 ARACHNOID CYST OF POSTERIOR CRANIAL FOSSA: Primary | ICD-10-CM

## 2024-01-23 DIAGNOSIS — M54.50 LOW BACK PAIN, UNSPECIFIED BACK PAIN LATERALITY, UNSPECIFIED CHRONICITY, UNSPECIFIED WHETHER SCIATICA PRESENT: ICD-10-CM

## 2024-01-23 DIAGNOSIS — F32.A DEPRESSION, UNSPECIFIED DEPRESSION TYPE: ICD-10-CM

## 2024-01-23 DIAGNOSIS — F41.9 ANXIETY: ICD-10-CM

## 2024-01-23 PROCEDURE — 99214 OFFICE O/P EST MOD 30 MIN: CPT | Performed by: FAMILY MEDICINE

## 2024-01-23 RX ORDER — ALPRAZOLAM 0.5 MG/1
TABLET ORAL EVERY 8 HOURS
COMMUNITY
Start: 2019-11-13

## 2024-01-23 RX ORDER — OMEPRAZOLE 40 MG/1
CAPSULE, DELAYED RELEASE ORAL EVERY 24 HOURS
COMMUNITY
Start: 2010-02-02

## 2024-01-23 RX ORDER — FLUOXETINE HYDROCHLORIDE 60 MG/1
60 TABLET, FILM COATED ORAL; ORAL
COMMUNITY
End: 2024-05-28 | Stop reason: SDUPTHER

## 2024-01-23 RX ORDER — IBUPROFEN 600 MG/1
600 TABLET ORAL 2 TIMES DAILY
Qty: 60 TABLET | Refills: 1 | Status: SHIPPED | OUTPATIENT
Start: 2024-01-23 | End: 2024-03-23

## 2024-01-23 RX ORDER — IBUPROFEN 600 MG/1
TABLET ORAL
COMMUNITY
Start: 2023-12-28 | End: 2024-01-23 | Stop reason: SDUPTHER

## 2024-01-23 ASSESSMENT — PATIENT HEALTH QUESTIONNAIRE - PHQ9
2. FEELING DOWN, DEPRESSED OR HOPELESS: NOT AT ALL
SUM OF ALL RESPONSES TO PHQ9 QUESTIONS 1 AND 2: 0
1. LITTLE INTEREST OR PLEASURE IN DOING THINGS: NOT AT ALL

## 2024-01-23 ASSESSMENT — PAIN SCALES - GENERAL: PAINLEVEL: 6

## 2024-01-23 NOTE — PROGRESS NOTES
Subjective   Patient ID: Jose M Anne is a 54 y.o. male who presents for Hospital Follow-up (Found cyst on brain/Wants to talk about medications/Would like to increase ibuprofen/Referral for psychiatry for xanax /).    HPI Here for follow-up to recent ED visit.  Patient went to the ED because of Right arm numbness.  While there imaging studies found that he has a 7 cm x 2 cm arachnoid cyst in the cerebellar region.  Patient states that he has noticed that he has been increasingly forgetful with poor memory and mild agitation for at least the last 6 months.  Patient has significant low back pain which is chronic.  He is got disc disease.  He has been seen by a back surgeon and is in the process of physical therapy for this.  Patient has chronic depression.  He is currently on fluoxetine and feels the medication may not be appropriate for him.  He feels that it is not as effective as it used to be.  Patient has anxiety.  He uses alprazolam as needed.    Review of Systems  Constitutional: Patient is negative for fever, fatigue, weight change.  HEENT: Patient is negative for change in vision, hearing, swallow.  Cardio: Patient is negative for chest pain, lower extremity edema.  Pulmonary: Patient is negative for cough, shortness of breath.  Musculoskeletal: Patient is positive for chronic low back pain.  Patient is positive for intermittent numbness to the right arm.  Neuro: Patient is positive for numbness in the right arm.  Psychiatric: Patient is positive for agitation and depression with anxiety.  Objective   /78   Pulse 89   Wt 77 kg (169 lb 12.8 oz)   SpO2 98%   BMI 24.36 kg/m²     Physical Exam  General: Awake and alert no apparent distress.  HEENT: Moist oral mucosa no cervical lymphadenopathy.  Cardio: Heart S1-S2 no murmur rub or gallop.  Pulmonary: Lungs clear to auscultation bilaterally.  Assessment/Plan   Problem List Items Addressed This Visit    None  Visit Diagnoses         Codes    Arachnoid  cyst of posterior cranial fossa    -  Primary  Needs referral.  Patient be referred to neurosurgery. G93.0    Relevant Orders    Referral to Neurosurgery    Memory loss    Needs referral.  Will be referred to psychiatry. R41.3    Relevant Orders    Referral to Psychiatry    Low back pain, unspecified back pain laterality, unspecified chronicity, unspecified whether sciatica present    Chronic.  Patient in the process of seeing a back specialist. M54.50    Relevant Medications    ibuprofen (IBU) 600 mg tablet    Depression, unspecified depression type    Needs better control.  Continue on fluoxetine.  However patient will be referred to psychiatry for further evaluation F32.A    Anxiety    .  Intermittent.  Patient is referred to psychiatry. F41.9

## 2024-05-28 ENCOUNTER — TELEPHONE (OUTPATIENT)
Dept: PRIMARY CARE | Facility: CLINIC | Age: 54
End: 2024-05-28
Payer: COMMERCIAL

## 2024-05-28 DIAGNOSIS — F32.A DEPRESSION, UNSPECIFIED DEPRESSION TYPE: Primary | ICD-10-CM

## 2024-05-28 DIAGNOSIS — M54.50 LOW BACK PAIN, UNSPECIFIED BACK PAIN LATERALITY, UNSPECIFIED CHRONICITY, UNSPECIFIED WHETHER SCIATICA PRESENT: ICD-10-CM

## 2024-05-28 RX ORDER — FLUOXETINE HYDROCHLORIDE 60 MG/1
60 TABLET, FILM COATED ORAL; ORAL
Qty: 90 TABLET | Refills: 3 | Status: SHIPPED | OUTPATIENT
Start: 2024-05-28 | End: 2025-05-28

## 2024-05-28 NOTE — TELEPHONE ENCOUNTER
PT CALLED STATING THAT HE HAS BEEN WITH OUT  THE PROZAC FOR 2 WEEKS NOW. HE CAN NOT  GET AN APPOINTMENT ANYWHERE FOR 6 MONTHS OUT. PLEASE ADVISE,   PT IS A LITTLE ON EDGE.  [phone]

## 2024-05-29 ENCOUNTER — TELEPHONE (OUTPATIENT)
Dept: PRIMARY CARE | Facility: CLINIC | Age: 54
End: 2024-05-29
Payer: COMMERCIAL

## 2024-05-29 RX ORDER — IBUPROFEN 600 MG/1
600 TABLET ORAL 2 TIMES DAILY PRN
Qty: 120 TABLET | Refills: 0 | Status: SHIPPED | OUTPATIENT
Start: 2024-05-29 | End: 2025-05-29

## 2024-07-07 ENCOUNTER — HOSPITAL ENCOUNTER (EMERGENCY)
Facility: HOSPITAL | Age: 54
Discharge: HOME | End: 2024-07-07
Payer: COMMERCIAL

## 2024-07-07 ENCOUNTER — APPOINTMENT (OUTPATIENT)
Dept: RADIOLOGY | Facility: HOSPITAL | Age: 54
End: 2024-07-07
Payer: COMMERCIAL

## 2024-07-07 VITALS
DIASTOLIC BLOOD PRESSURE: 59 MMHG | SYSTOLIC BLOOD PRESSURE: 105 MMHG | OXYGEN SATURATION: 95 % | TEMPERATURE: 98.2 F | RESPIRATION RATE: 18 BRPM | WEIGHT: 160 LBS | HEART RATE: 58 BPM | HEIGHT: 71 IN | BODY MASS INDEX: 22.4 KG/M2

## 2024-07-07 DIAGNOSIS — M51.36 LUMBAR DEGENERATIVE DISC DISEASE: Primary | ICD-10-CM

## 2024-07-07 PROCEDURE — 2500000005 HC RX 250 GENERAL PHARMACY W/O HCPCS: Performed by: PHYSICIAN ASSISTANT

## 2024-07-07 PROCEDURE — 2500000004 HC RX 250 GENERAL PHARMACY W/ HCPCS (ALT 636 FOR OP/ED): Performed by: PHYSICIAN ASSISTANT

## 2024-07-07 PROCEDURE — 72100 X-RAY EXAM L-S SPINE 2/3 VWS: CPT | Performed by: RADIOLOGY

## 2024-07-07 PROCEDURE — 96372 THER/PROPH/DIAG INJ SC/IM: CPT | Performed by: PHYSICIAN ASSISTANT

## 2024-07-07 PROCEDURE — 2500000001 HC RX 250 WO HCPCS SELF ADMINISTERED DRUGS (ALT 637 FOR MEDICARE OP): Performed by: PHYSICIAN ASSISTANT

## 2024-07-07 PROCEDURE — 72100 X-RAY EXAM L-S SPINE 2/3 VWS: CPT

## 2024-07-07 PROCEDURE — 99283 EMERGENCY DEPT VISIT LOW MDM: CPT

## 2024-07-07 RX ORDER — CYCLOBENZAPRINE HCL 5 MG
5 TABLET ORAL 3 TIMES DAILY PRN
Qty: 21 TABLET | Refills: 0 | Status: SHIPPED | OUTPATIENT
Start: 2024-07-07

## 2024-07-07 RX ORDER — PREDNISONE 20 MG/1
TABLET ORAL DAILY
Qty: 12 TABLET | Refills: 0 | Status: SHIPPED | OUTPATIENT
Start: 2024-07-07 | End: 2024-07-15

## 2024-07-07 RX ORDER — ORPHENADRINE CITRATE 30 MG/ML
60 INJECTION INTRAMUSCULAR; INTRAVENOUS ONCE
Status: COMPLETED | OUTPATIENT
Start: 2024-07-07 | End: 2024-07-07

## 2024-07-07 RX ORDER — KETOROLAC TROMETHAMINE 30 MG/ML
15 INJECTION, SOLUTION INTRAMUSCULAR; INTRAVENOUS ONCE
Status: COMPLETED | OUTPATIENT
Start: 2024-07-07 | End: 2024-07-07

## 2024-07-07 RX ORDER — DEXAMETHASONE SODIUM PHOSPHATE 100 MG/10ML
10 INJECTION INTRAMUSCULAR; INTRAVENOUS ONCE
Status: COMPLETED | OUTPATIENT
Start: 2024-07-07 | End: 2024-07-07

## 2024-07-07 RX ORDER — ACETAMINOPHEN 500 MG
1000 TABLET ORAL ONCE
Status: COMPLETED | OUTPATIENT
Start: 2024-07-07 | End: 2024-07-07

## 2024-07-07 RX ORDER — LIDOCAINE 560 MG/1
1 PATCH PERCUTANEOUS; TOPICAL; TRANSDERMAL ONCE
Status: DISCONTINUED | OUTPATIENT
Start: 2024-07-07 | End: 2024-07-07 | Stop reason: HOSPADM

## 2024-07-07 NOTE — Clinical Note
Jose M Anne was seen and treated in our emergency department on 7/7/2024.  He may return to work on 07/15/2024.  Light duty until patient follows up with his primary care doctor or orthopedic spine doctor     If you have any questions or concerns, please don't hesitate to call.      Maite Kim PA-C

## 2024-07-07 NOTE — DISCHARGE INSTRUCTIONS
Over-the-counter Tylenol in addition to prednisone and cyclobenzaprine.  Please do not take cyclobenzaprine and then drive or operate machinery as it may cause drowsiness.  Please follow-up with your primary care doctor and orthopedic spine as listed in your discharge paperwork.  I recommend heat and ice.  Return to the emergency department with new or worsening symptoms with the onset of new symptoms.

## 2024-07-08 NOTE — ED PROVIDER NOTES
HPI   Chief Complaint   Patient presents with    Back Pain     Back pain since last Friday, fell down stairs.  Nothing helps with relief.       This is a 54-year-old male presenting to the emergency department for low back pain.  Patient states that he has a history of chronic low back pain and chronic degenerative disc disease.  Patient states that 8 days ago he fell down approximately 8 steps.  He fell on his bottom.  He did not hit his head.  There is no loss of consciousness.  He was able to get up and ambulate afterwards.  Patient states that over the last week he has been having stiffness in his low back from the fall.  There is no pain down his legs.  No saddle paresthesia.  No loss of bowel or bladder function.      Please see HPI for pertinent positive and negative ROS.                   [place] Coma Scale Score: 15                     Patient History   No past medical history on file.  No past surgical history on file.  No family history on file.  Social History     Tobacco Use    Smoking status: Every Day     Types: Cigarettes    Smokeless tobacco: Never   Vaping Use    Vaping status: Never Used   Substance Use Topics    Alcohol use: Never    Drug use: Never       Physical Exam   ED Triage Vitals   Temperature Heart Rate Respirations BP   07/07/24 1223 07/07/24 1223 07/07/24 1223 07/07/24 1224   36.4 °C (97.5 °F) 94 20 89/70      Pulse Ox Temp Source Heart Rate Source Patient Position   07/07/24 1224 07/07/24 1224 07/07/24 1223 07/07/24 1223   99 % Tympanic Monitor Sitting      BP Location FiO2 (%)     07/07/24 1223 --     Left arm        Physical Exam  GENERAL APPEARANCE: Awake and alert. No acute aspiratory distress.   VITAL SIGNS: As per the nurses' triage record.  HEENT: Normocephalic, atraumatic. Extraocular muscles are intact. Conjunctiva are pink. Negative scleral icterus. Mucous membranes are moist. Tongue in the midline. Oropharynx clear, uvula midline.   NECK: Soft, nontender and supple, full  gross ROM, no meningeal signs.  CHEST: Nontender to palpation. Clear to auscultation bilaterally. No rales, rhonchi, or wheezing. Symmetric rise and fall of chest wall.   HEART: Clear S1 and S2. Regular rate and rhythm. No murmurs appreciated on auscultation.  Strong and equal pulses in the extremities.  ABDOMEN: Soft, nontender, nondistended, positive bowel sounds, no palpable masses.  MUSCULOSKELETAL: The calves are nontender to palpation. Full gross active range of motion. Ambulating on own with no acute difficulties.  Patient does not have any tenderness to palpation over the cervical, thoracic or lumbar spinous processes.  She does have some tenderness palpation over the right lumbar paraspinal muscles.  NEUROLOGICAL: Awake, alert and oriented x 3. Motor power intact in the upper and lower extremities. Sensation is intact to light touch in the upper and lower extremities. Patient answering questions appropriately.  Patient is able to spread fingers apart, trunk sensation is intact bilaterally, inner thigh sensation intact bilaterally, he is able to adduct thighs to cross legs bilaterally, he is able to extend knees bilaterally, he is able to flex knees bilaterally, he can dorsiflex ankle up bilaterally, he is able to plantarflex his foot and toes bilaterally, he is able to point great toe up bilaterally, he has sensation in his groin and perineal region.   IMMUNOLOGICAL: No lymphatic streaking noted  DERMATOLOGIC: Warm and dry without petechiae, rashes, or ecchymosis noted on visible skin.  There is no overlying skin changes of the back.  PYSCH: Cooperative with appropriate mood and affect.  ED Course & MDM   Diagnoses as of 07/08/24 1057   Lumbar degenerative disc disease       Medical Decision Making  Parts of this chart have been completed using voice recognition software. Please excuse any errors of transcription.  My thought process and reason for plan has been formulated from the time that I saw the  patient until the time of disposition and is not specific to one specific moment during their visit and furthermore my MDM encompasses this entire chart and not only this text box.      HPI: Detailed above.    Exam: A medically appropriate exam performed, outlined above, given the known history and presentation.    History obtained from: Patient    Medications given during visit:  Medications   acetaminophen (Tylenol) tablet 1,000 mg (1,000 mg oral Given 7/7/24 1251)   dexAMETHasone (Decadron) injection 10 mg (10 mg intramuscular Given 7/7/24 1250)   ketorolac (Toradol) injection 15 mg (15 mg intramuscular Given 7/7/24 1250)   orphenadrine (Norflex) injection 60 mg (60 mg intramuscular Given 7/7/24 1250)        Diagnostic/tests  Labs Reviewed - No data to display   XR lumbar spine 2-3 views   Final Result   Degenerative changes, rotation/dextroscoliosis and multiple level   disc disease, similar to prior, without acute fracture or subluxation.        Signed by: Luis M Wheeler 7/7/2024 1:34 PM   Dictation workstation:   [id number]           Considerations/further MDM:    Blood pressure 105/59, temperature 98.2 °F, heart rate 58 bpm, respirations 18, 95% on room air     Patient's high-sensitivity neuroexam is unremarkable.  Low red flag score.  Therefore I have low clinical suspicion for cauda equina syndrome, spinal epidural abscess, mass lesion, or other infectious process assessed as osteomyelitis, transverse myelitis, discitis or severe spinal stenosis requiring emergent surgical intervention.  Due to this low risk, although I have considered imaging with MRI or CT of the spine, imaging is deferred at this time.  Patient stating that 8 days ago he did have a fall down the stairs, I did proceed with x-ray of the lumbar spine to ensure no evidence of fracture.  Patient does not have any midline tenderness to palpation and no bony step-offs appreciated, therefore I do have low clinical suspicion for this but wanted  to proceed with x-ray due to mechanism of injury.  X-ray showed degenerative changes with multilevel disc disease, without acute fracture or subluxation.  Patient was treated with oral Tylenol, IM dexamethasone, IM Toradol and IM Norflex with significant improvement in his pain. Patient will be manage with symptomatic support.  Patient was referred to orthopedic spine, Dr. Velazco.  Discharged with a prescription for steroid and muscle relaxer.  Educated on heat and ice.  Educated on red flag symptoms to return to the emergency department for.  Patient verbalized understanding felt comfortable discharge plan home.  He was discharged in stable condition.      Procedure  Procedures     Maite Kim PA-C  07/08/24 1059

## 2024-07-30 ENCOUNTER — TELEPHONE (OUTPATIENT)
Dept: PRIMARY CARE | Facility: CLINIC | Age: 54
End: 2024-07-30
Payer: COMMERCIAL

## 2024-07-30 DIAGNOSIS — M51.36 DEGENERATIVE DISC DISEASE, LUMBAR: ICD-10-CM

## 2024-07-30 DIAGNOSIS — M51.36 DEGENERATIVE DISC DISEASE, LUMBAR: Primary | ICD-10-CM

## 2024-07-30 NOTE — TELEPHONE ENCOUNTER
Patient called [phone] he would like a referral to pain management for degenerative disc disease, has seen Dr Alcocer for this many times, has been to the ER

## 2024-07-31 NOTE — TELEPHONE ENCOUNTER
Tried to call patient to tell him that referral was done but his voice mail box has not been set up.  Referral mailed to patient.

## 2024-08-06 ENCOUNTER — APPOINTMENT (OUTPATIENT)
Dept: PAIN MEDICINE | Facility: CLINIC | Age: 54
End: 2024-08-06
Payer: COMMERCIAL

## 2024-08-20 ENCOUNTER — OFFICE VISIT (OUTPATIENT)
Dept: ORTHOPEDIC SURGERY | Facility: CLINIC | Age: 54
End: 2024-08-20
Payer: COMMERCIAL

## 2024-08-20 VITALS — WEIGHT: 160 LBS | BODY MASS INDEX: 22.4 KG/M2 | HEIGHT: 71 IN

## 2024-08-20 DIAGNOSIS — S32.000A LUMBAR COMPRESSION FRACTURE, CLOSED, INITIAL ENCOUNTER (MULTI): Primary | ICD-10-CM

## 2024-08-20 DIAGNOSIS — M41.9 SCOLIOSIS OF LUMBAR SPINE, UNSPECIFIED SCOLIOSIS TYPE: ICD-10-CM

## 2024-08-20 DIAGNOSIS — M54.16 LUMBAR RADICULOPATHY: ICD-10-CM

## 2024-08-20 PROCEDURE — 3008F BODY MASS INDEX DOCD: CPT | Performed by: ORTHOPAEDIC SURGERY

## 2024-08-20 PROCEDURE — 99214 OFFICE O/P EST MOD 30 MIN: CPT | Performed by: ORTHOPAEDIC SURGERY

## 2024-08-20 ASSESSMENT — PAIN - FUNCTIONAL ASSESSMENT: PAIN_FUNCTIONAL_ASSESSMENT: 0-10

## 2024-08-20 NOTE — LETTER
August 21, 2024     Kenneth Alcocer MD  [address] [place] [address]  [facility]  [address]  [place] [address]    Patient: Jose M Anne   YOB: 1970   Date of Visit: 8/20/2024       Dear Dr. Kenneth Alcocer MD:    Thank you for referring Jose M Anne to me for evaluation. Below are my notes for this consultation.  If you have questions, please do not hesitate to call me. I look forward to following your patient along with you.       Sincerely,     Bernabe Madrigal MD      CC: No Recipients  ______________________________________________________________________________________    HPI:Jose M Anne is a 54-year-old man, who comes in today with complaints of back pain and bilateral leg pain.  Pain is worse in the legs when he stands and attempts to walk.  He has a hard time walking any distance.  He has been into the emergency room twice over the course of last 6 months secondary to his pain.  He has done physical therapy at [facility] as well as steroid injections.  Despite this nonoperative management, his symptoms persist.      ROS:  Reviewed on EMR and patient intake sheet.    PMH/SH:  Reviewed on EMR and patient intake sheet.    Exam:  Physical Exam    Constitutional: Well appearing; no acute distress  Eyes: pupils are equal and round  Psych: normal affect  Respiratory: non-labored breathing  Cardiovascular: regular rate and rhythm  GI: non-distended abdomen  Musculoskeletal: no pain with range of motion of the hips bilaterally  Neurologic: [5]/5 strength in the lower extremities bilaterally]; [negative] straight leg raise    Radiology:     Standing x-rays demonstrated degenerative lumbar scoliosis with a 19 degree coronal curve from L1-L3.  He has lateral listhesis at L3-4 of 6.1 mm.  Severe disc degeneration noted at L5-S1.    Diagnosis:    Neurogenic claudication secondary spinal stenosis; degenerative lumbar scoliosis; degenerative spondylolisthesis    Assessment and Plan:    54-year-old male with intractable claudication radicular symptoms secondary to spinal stenosis.  His symptoms have persisted despite nonoperative management going physical therapy and steroid injections.  Surgical invention would be the next step.  He will need an MRI and follow-up to discuss the specifics of that.  Prior to surgery, the patient will need to stop smoking.  An extended discussion was given on the importance of smoking cessation in regards to the overall disease course in the spine.  The patient expresses understanding that smoking affects the degenerative process in the spine, accentuating degenerative disease and the symptoms there of.  The patient understands the vital role smoking plays in the post operative healing course and that smoking places the patient at extremely high risk for non-union and wound healing problems.  The patient understands that smoking is a personal, patient specific, and disease modifiable choice and that any untoward consequences in regard to further, ongoing symptoms or problems with post-operative healing, in any way attributable to nicotine use, are the sole responsibility of the patient.  The patient expressed gratitude in regards to the education given today on this topic.  Patient committed to smoking cessation as of today.  I will see him back to go over the MRI and discuss further surgical options.    The patient was in agreement with the plan. At the end of the visit today, the patient felt that all questions had been answered satisfactorily.  The patient was pleased with the visit and very appreciative for the care rendered.     Thank you very much for the kind referral.  It is a privilege, and a pleasure, to partner with you in the care of your patients.  I would be delighted to assist you with any further consultations as needed.          Bernabe Madrigal MD    Chief of Spine Surgery, [facility]  Director of Spine Service,  [facility]  , Department of Orthopaedics  [employer]  [address]  [place] [id number]  P: [phone]  [email or web address]    This note was dictated with voice recognition software.  It has not been proofread for grammatical errors, typographical mistakes or other semantic inconsistencies.

## 2024-08-21 NOTE — PROGRESS NOTES
HPI:Jose M Anne is a 54-year-old man, who comes in today with complaints of back pain and bilateral leg pain.  Pain is worse in the legs when he stands and attempts to walk.  He has a hard time walking any distance.  He has been into the emergency room twice over the course of last 6 months secondary to his pain.  He has done physical therapy at [facility] as well as steroid injections.  Despite this nonoperative management, his symptoms persist.      ROS:  Reviewed on EMR and patient intake sheet.    PMH/SH:  Reviewed on EMR and patient intake sheet.    Exam:  Physical Exam    Constitutional: Well appearing; no acute distress  Eyes: pupils are equal and round  Psych: normal affect  Respiratory: non-labored breathing  Cardiovascular: regular rate and rhythm  GI: non-distended abdomen  Musculoskeletal: no pain with range of motion of the hips bilaterally  Neurologic: [5]/5 strength in the lower extremities bilaterally]; [negative] straight leg raise    Radiology:     Standing x-rays demonstrated degenerative lumbar scoliosis with a 19 degree coronal curve from L1-L3.  He has lateral listhesis at L3-4 of 6.1 mm.  Severe disc degeneration noted at L5-S1.    Diagnosis:    Neurogenic claudication secondary spinal stenosis; degenerative lumbar scoliosis; degenerative spondylolisthesis    Assessment and Plan:   54-year-old male with intractable claudication radicular symptoms secondary to spinal stenosis.  His symptoms have persisted despite nonoperative management going physical therapy and steroid injections.  Surgical invention would be the next step.  He will need an MRI and follow-up to discuss the specifics of that.  Prior to surgery, the patient will need to stop smoking.  An extended discussion was given on the importance of smoking cessation in regards to the overall disease course in the spine.  The patient expresses understanding that smoking affects the degenerative process in the spine,  accentuating degenerative disease and the symptoms there of.  The patient understands the vital role smoking plays in the post operative healing course and that smoking places the patient at extremely high risk for non-union and wound healing problems.  The patient understands that smoking is a personal, patient specific, and disease modifiable choice and that any untoward consequences in regard to further, ongoing symptoms or problems with post-operative healing, in any way attributable to nicotine use, are the sole responsibility of the patient.  The patient expressed gratitude in regards to the education given today on this topic.  Patient committed to smoking cessation as of today.  I will see him back to go over the MRI and discuss further surgical options.    The patient was in agreement with the plan. At the end of the visit today, the patient felt that all questions had been answered satisfactorily.  The patient was pleased with the visit and very appreciative for the care rendered.     Thank you very much for the kind referral.  It is a privilege, and a pleasure, to partner with you in the care of your patients.  I would be delighted to assist you with any further consultations as needed.          Bernabe Madrigal MD    Chief of Spine Surgery, [facility]  Director of Spine Service, [facility]  , Department of Orthopaedics  [facility] [employer]  [id number] [place] [address]  P: [phone]  [facility]    This note was dictated with voice recognition software.  It has not been proofread for grammatical errors, typographical mistakes or other semantic inconsistencies.

## 2025-01-29 ENCOUNTER — APPOINTMENT (OUTPATIENT)
Dept: PRIMARY CARE | Facility: CLINIC | Age: 55
End: 2025-01-29
Payer: COMMERCIAL
